# Patient Record
Sex: FEMALE | Race: WHITE | NOT HISPANIC OR LATINO | Employment: OTHER | URBAN - METROPOLITAN AREA
[De-identification: names, ages, dates, MRNs, and addresses within clinical notes are randomized per-mention and may not be internally consistent; named-entity substitution may affect disease eponyms.]

---

## 2024-10-07 ENCOUNTER — APPOINTMENT (OUTPATIENT)
Dept: PRIMARY CARE | Facility: CLINIC | Age: 39
End: 2024-10-07

## 2024-10-07 ENCOUNTER — LAB (OUTPATIENT)
Dept: LAB | Facility: LAB | Age: 39
End: 2024-10-07
Payer: COMMERCIAL

## 2024-10-07 VITALS
TEMPERATURE: 97.3 F | DIASTOLIC BLOOD PRESSURE: 78 MMHG | HEIGHT: 63 IN | SYSTOLIC BLOOD PRESSURE: 113 MMHG | WEIGHT: 184 LBS | HEART RATE: 64 BPM | BODY MASS INDEX: 32.6 KG/M2 | OXYGEN SATURATION: 97 %

## 2024-10-07 DIAGNOSIS — S29.012S STRAIN OF MUSCLE AND TENDON OF BACK WALL OF THORAX, SEQUELA: ICD-10-CM

## 2024-10-07 DIAGNOSIS — Z00.00 HEALTHCARE MAINTENANCE: ICD-10-CM

## 2024-10-07 DIAGNOSIS — Z00.00 ENCOUNTER FOR ADULT WELLNESS VISIT: Primary | ICD-10-CM

## 2024-10-07 PROCEDURE — 80061 LIPID PANEL: CPT

## 2024-10-07 PROCEDURE — 85025 COMPLETE CBC W/AUTO DIFF WBC: CPT

## 2024-10-07 PROCEDURE — 36415 COLL VENOUS BLD VENIPUNCTURE: CPT

## 2024-10-07 PROCEDURE — 80053 COMPREHEN METABOLIC PANEL: CPT

## 2024-10-07 PROCEDURE — 82306 VITAMIN D 25 HYDROXY: CPT

## 2024-10-07 PROCEDURE — 3008F BODY MASS INDEX DOCD: CPT | Performed by: STUDENT IN AN ORGANIZED HEALTH CARE EDUCATION/TRAINING PROGRAM

## 2024-10-07 PROCEDURE — 84443 ASSAY THYROID STIM HORMONE: CPT

## 2024-10-07 PROCEDURE — 1036F TOBACCO NON-USER: CPT | Performed by: STUDENT IN AN ORGANIZED HEALTH CARE EDUCATION/TRAINING PROGRAM

## 2024-10-07 PROCEDURE — 99395 PREV VISIT EST AGE 18-39: CPT | Performed by: STUDENT IN AN ORGANIZED HEALTH CARE EDUCATION/TRAINING PROGRAM

## 2024-10-07 ASSESSMENT — ENCOUNTER SYMPTOMS
LIGHT-HEADEDNESS: 0
PALPITATIONS: 0
NERVOUS/ANXIOUS: 0
NAUSEA: 0
CONSTIPATION: 0
FREQUENCY: 0
FEVER: 0
COUGH: 0
DIZZINESS: 0
CHILLS: 0
SHORTNESS OF BREATH: 0
ABDOMINAL PAIN: 0
MYALGIAS: 1
FATIGUE: 0
DIARRHEA: 0
DYSPHORIC MOOD: 0
NUMBNESS: 0
RHINORRHEA: 0
COLOR CHANGE: 0
VOMITING: 0
DYSURIA: 0
ARTHRALGIAS: 0

## 2024-10-07 ASSESSMENT — PATIENT HEALTH QUESTIONNAIRE - PHQ9
SUM OF ALL RESPONSES TO PHQ9 QUESTIONS 1 AND 2: 0
1. LITTLE INTEREST OR PLEASURE IN DOING THINGS: NOT AT ALL
2. FEELING DOWN, DEPRESSED OR HOPELESS: NOT AT ALL

## 2024-10-07 NOTE — PATIENT INSTRUCTIONS
N acetyl Cysteine 600mg up to 3 times a day during illness.    Thank you for coming in for your wellness examination.  I would add order lab work for you.  Since you are fasting now, you can go right downstairs and get this done.    Keep up the good work with overall fairly healthy diet and some of the changes that you are making.  As we discussed, I recommend plenty of fruits and vegetables, lean sources of protein, watching your carbohydrate intake and opting for more complex sources and avoiding added sugars, as well as not drinking extra calories.  For exercise I recommend some sort of combination of cardiovascular activity, strength training as well as proper warming up and stretching.  You do need a fairly active lifestyle for the most part especially with your job and being on your feet for most of the day as well as with 2 young kids.  Whenever you can work and fracture timing I am always going to recommend in terms of additional exercise.    For your back, it does seem more like a muscle strain on the left side.  I would recommend using regular heating pads as well as stretching.  We could consider physical therapy down the line if it fails to make adequate improvement.  I would also recommend slowly increasing strength training on that side to increase muscle tone to prevent further injuries in the future.    Excellent work with avoiding tobacco or other drug use and limiting alcohol content.    Please continue with regular follow-up with your OB/GYN.    Please continue with yearly follow-up.    Please call sooner with any other acute concerns or complaints.    Thank you

## 2024-10-07 NOTE — PROGRESS NOTES
"Subjective   Patient ID: Sosa Amos is a 39 y.o. female who presents for [facility] ( ).    HPI     No acute concerns or complaints today.  She has had some left mid back pain that has overall improved.    Will be getting set up with a dermatologist.    Dental: Regular dental exams twice yearly.  Vision: No recent eye exams. Sometimes wears glasses at night.    Diet: Overall balanced. 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 Less carbs and more fruits and vegetables. Lots of water.  Exercise: On her feet all day. Sometimes walking.  Caffeine: Not much  Fluids: Well hydrated.  Supplements: Prenatal, vitamin D, zinc    Tobacco: None. Never.  Alcohol: Maybe 1 drink per week.  Recreational Drugs: None.    Last Colonoscopy: No family history colon cancer.  Last Pap smear: Following with her Ob/Gyn. Up to date on PAPs.  Mammogram: Maternal grandmother diagnosed in her 70s  Low Dose Lung CT Screening: Not indicated.  AAA Screening: Not indicated.    Influenza: recommended annually.  Tetanus: 2023.  COVID: Recommended updated vaccine.    Review of Systems   Constitutional:  Negative for chills, fatigue and fever.   HENT:  Negative for congestion and rhinorrhea.    Eyes:  Negative for visual disturbance.   Respiratory:  Negative for cough and shortness of breath.    Cardiovascular:  Negative for chest pain and palpitations.   Gastrointestinal:  Negative for abdominal pain, constipation, diarrhea, nausea and vomiting.   Genitourinary:  Negative for dysuria and frequency.   Musculoskeletal:  Positive for myalgias (left flank/back strain). Negative for arthralgias.   Skin:  Negative for color change and rash.   Neurological:  Negative for dizziness, light-headedness and numbness.   Psychiatric/Behavioral:  Negative for dysphoric mood. The patient is not nervous/anxious.        Objective   /78   Pulse 64   Temp 36.3 °C (97.3 °F)   Ht 1.588 m (5' 2.5\")   Wt 83.5 kg (184 lb)   SpO2 97%   BMI 33.12 kg/m² "   BSA Body surface area is 1.92 meters squared.      Physical Exam  Vitals and nursing note reviewed.   Constitutional:       General: She is not in acute distress.     Appearance: Normal appearance. She is normal weight. She is not ill-appearing or toxic-appearing.   HENT:      Head: Normocephalic and atraumatic.      Right Ear: Tympanic membrane, ear canal and external ear normal.      Left Ear: Tympanic membrane, ear canal and external ear normal.      Nose: Nose normal.      Mouth/Throat:      Mouth: Mucous membranes are moist.   Eyes:      Extraocular Movements: Extraocular movements intact.      Conjunctiva/sclera: Conjunctivae normal.      Pupils: Pupils are equal, round, and reactive to light.   Cardiovascular:      Rate and Rhythm: Normal rate and regular rhythm.      Pulses: Normal pulses.      Heart sounds: Normal heart sounds.   Pulmonary:      Effort: Pulmonary effort is normal.      Breath sounds: Normal breath sounds.   Abdominal:      General: Abdomen is flat. Bowel sounds are normal.      Palpations: Abdomen is soft.   Musculoskeletal:         General: Normal range of motion.      Cervical back: Normal range of motion and neck supple.   Skin:     General: Skin is warm.   Neurological:      General: No focal deficit present.      Mental Status: She is alert and oriented to person, place, and time. Mental status is at baseline.      Cranial Nerves: No cranial nerve deficit.      Sensory: No sensory deficit.   Psychiatric:         Mood and Affect: Mood normal.         Behavior: Behavior normal.         Thought Content: Thought content normal.         Judgment: Judgment normal.       No visits with results within 1 Year(s) from this visit.   Latest known visit with results is:   Legacy Encounter on 01/09/2023   Component Date Value Ref Range Status    TSH 01/09/2023 1.57  0.44 - 3.98 mIU/L Final    Comment:  TSH testing is performed using different testing    methodology at [facility] than  at other    [facility] hospitals. Direct result comparisons should    only be made within the same method.      Cholesterol 01/09/2023 195  0 - 199 mg/dL Final    Comment: .      AGE      DESIRABLE   BORDERLINE HIGH   HIGH     0-19 Y     0 - 169       170 - 199     >/= 200    20-24 Y     0 - 189       190 - 224     >/= 225         >24 Y     0 - 199       200 - 239     >/= 240   **All ranges are based on fasting samples. Specific   therapeutic targets will vary based on patient-specific   cardiac risk.  .   Pediatric guidelines reference:Pediatrics 2011, 128(S5).   Adult guidelines reference: NCEP ATPIII Guidelines,     KAREEN 2001, 258:4066-97  .   Venipuncture immediately after or during the    administration of Metamizole may lead to falsely   low results. Testing should be performed immediately   prior to Metamizole dosing.      HDL 01/09/2023 63.6  mg/dL Final    Comment: .      AGE      VERY LOW   LOW     NORMAL    HIGH       0-19 Y       < 35   < 40     40-45     ----    20-24 Y       ----   < 40       >45     ----      >24 Y       ----   < 40     40-60      >60  .      Cholesterol/HDL Ratio 01/09/2023 3.1   Final    Comment: REF VALUES  DESIRABLE  < 3.4  HIGH RISK  > 5.0      LDL 01/09/2023 115 (H)  0 - 99 mg/dL Final    Comment: .                           NEAR      BORD      AGE      DESIRABLE  OPTIMAL    HIGH     HIGH     VERY HIGH     0-19 Y     0 - 109     ---    110-129   >/= 130     ----    20-24 Y     0 - 119     ---    120-159   >/= 160     ----      >24 Y     0 -  99   100-129  130-159   160-189     >/=190  .      VLDL 01/09/2023 17  0 - 40 mg/dL Final    Triglycerides 01/09/2023 84  0 - 149 mg/dL Final    Comment: .      AGE      DESIRABLE   BORDERLINE HIGH   HIGH     VERY HIGH   0 D-90 D    19 - 174         ----         ----        ----  91 D- 9 Y     0 -  74        75 -  99     >/= 100      ----    10-19 Y     0 -  89        90 - 129     >/= 130      ----    20-24 Y     0 - 114       115 - 149     >/=  150      ----         >24 Y     0 - 149       150 - 199    200- 499    >/= 500  .   Venipuncture immediately after or during the    administration of Metamizole may lead to falsely   low results. Testing should be performed immediately   prior to Metamizole dosing.      WBC 01/09/2023 7.1  4.4 - 11.3 x10E9/L Final    nRBC 01/09/2023 0.0  0.0 - 0.0 /100 WBC Final    RBC 01/09/2023 4.20  4.00 - 5.20 x10E12/L Final    Hemoglobin 01/09/2023 12.9  12.0 - 16.0 g/dL Final    Hematocrit 01/09/2023 39.6  36.0 - 46.0 % Final    MCV 01/09/2023 94  80 - 100 fL Final    MCHC 01/09/2023 32.6  32.0 - 36.0 g/dL Final    Platelets 01/09/2023 274  150 - 450 x10E9/L Final    RDW 01/09/2023 12.9  11.5 - 14.5 % Final    Neutrophils % 01/09/2023 54.5  40.0 - 80.0 % Final    Immature Granulocytes %, Automated 01/09/2023 0.3  0.0 - 0.9 % Final    Comment:  Immature Granulocyte Count (IG) includes promyelocytes,    myelocytes and metamyelocytes but does not include bands.   Percent differential counts (%) should be interpreted in the   context of the absolute cell counts (cells/L).      Lymphocytes % 01/09/2023 33.1  13.0 - 44.0 % Final    Monocytes % 01/09/2023 9.3  2.0 - 10.0 % Final    Eosinophils % 01/09/2023 2.5  0.0 - 6.0 % Final    Basophils % 01/09/2023 0.3  0.0 - 2.0 % Final    Neutrophils Absolute 01/09/2023 3.85  1.20 - 7.70 x10E9/L Final    Lymphocytes Absolute 01/09/2023 2.34  1.20 - 4.80 x10E9/L Final    Monocytes Absolute 01/09/2023 0.66  0.10 - 1.00 x10E9/L Final    Eosinophils Absolute 01/09/2023 0.18  0.00 - 0.70 x10E9/L Final    Basophils Absolute 01/09/2023 0.02  0.00 - 0.10 x10E9/L Final    Glucose 01/09/2023 90  74 - 99 mg/dL Final    Sodium 01/09/2023 140  136 - 145 mmol/L Final    Potassium 01/09/2023 4.4  3.5 - 5.3 mmol/L Final    Chloride 01/09/2023 104  98 - 107 mmol/L Final    Bicarbonate 01/09/2023 28  21 - 32 mmol/L Final    Anion Gap 01/09/2023 12  10 - 20 mmol/L Final    Urea Nitrogen 01/09/2023 12  6 - 23  mg/dL Final    Creatinine 01/09/2023 0.70  0.50 - 1.05 mg/dL Final    GFR Female 01/09/2023 >90  >90 mL/min/1.73m2 Final    Comment:  CALCULATIONS OF ESTIMATED GFR ARE PERFORMED   USING THE 2021 CKD-EPI STUDY REFIT EQUATION   WITHOUT THE RACE VARIABLE FOR THE IDMS-TRACEABLE   CREATININE METHODS.    https://jasn.asnjournals.org/content/early/2021/09/22/ASN.0150782656      Calcium 01/09/2023 9.1  8.6 - 10.6 mg/dL Final    Albumin 01/09/2023 4.3  3.4 - 5.0 g/dL Final    Alkaline Phosphatase 01/09/2023 54  33 - 110 U/L Final    Total Protein 01/09/2023 6.6  6.4 - 8.2 g/dL Final    AST 01/09/2023 15  9 - 39 U/L Final    Total Bilirubin 01/09/2023 0.4  0.0 - 1.2 mg/dL Final    ALT (SGPT) 01/09/2023 10  7 - 45 U/L Final    Comment:  Patients treated with Sulfasalazine may generate    falsely decreased results for ALT.       No current outpatient medications on file prior to visit.     No current facility-administered medications on file prior to visit.     No images are attached to the encounter.        Assessment/Plan   Problem List Items Addressed This Visit    None  Visit Diagnoses         Codes    Encounter for adult wellness visit    -  Primary Z00.00    Healthcare maintenance     Z00.00    Relevant Orders    CBC and Auto Differential    Comprehensive Metabolic Panel    Lipid Panel    TSH with reflex to Free T4 if abnormal    Vitamin D 25-Hydroxy,Total (for eval of Vitamin D levels)    Strain of muscle and tendon of back wall of thorax, sequela     S29.012S          History and physical examination as above.  Patient coming in for wellness care.  Lab work orders placed for the patient to be done fasting.  We will contact her back with results.  Discussed multiple aspects of health including healthy diet and exercise, regular dental and vision care, caffeine use, hydration, supplementation, substance use, screening test and immunizations.  Recommendations given as above.  Patient likely with a strain of her left  thoracic wall as discussed.  She would like to continue with at home management.  Did discuss that if symptoms worsen or fail to continue to improve, we can consider referral to physical therapy.  She will call if she does want this referral.  We will plan on continued regular wellness care based on the results of the lab work.  She will continue with following up with her OB/GYN.  She will come in sooner with any other acute concerns or complaints.

## 2024-10-08 LAB
25(OH)D3 SERPL-MCNC: 45 NG/ML (ref 30–100)
ALBUMIN SERPL BCP-MCNC: 4.4 G/DL (ref 3.4–5)
ALP SERPL-CCNC: 72 U/L (ref 33–110)
ALT SERPL W P-5'-P-CCNC: 10 U/L (ref 7–45)
ANION GAP SERPL CALC-SCNC: 16 MMOL/L (ref 10–20)
AST SERPL W P-5'-P-CCNC: 13 U/L (ref 9–39)
BASOPHILS # BLD AUTO: 0.03 X10*3/UL (ref 0–0.1)
BASOPHILS NFR BLD AUTO: 0.3 %
BILIRUB SERPL-MCNC: 0.5 MG/DL (ref 0–1.2)
BUN SERPL-MCNC: 16 MG/DL (ref 6–23)
CALCIUM SERPL-MCNC: 9.4 MG/DL (ref 8.6–10.6)
CHLORIDE SERPL-SCNC: 104 MMOL/L (ref 98–107)
CHOLEST SERPL-MCNC: 189 MG/DL (ref 0–199)
CHOLESTEROL/HDL RATIO: 3
CO2 SERPL-SCNC: 24 MMOL/L (ref 21–32)
CREAT SERPL-MCNC: 0.71 MG/DL (ref 0.5–1.05)
EGFRCR SERPLBLD CKD-EPI 2021: >90 ML/MIN/1.73M*2
EOSINOPHIL # BLD AUTO: 0.15 X10*3/UL (ref 0–0.7)
EOSINOPHIL NFR BLD AUTO: 1.7 %
ERYTHROCYTE [DISTWIDTH] IN BLOOD BY AUTOMATED COUNT: 12.4 % (ref 11.5–14.5)
GLUCOSE SERPL-MCNC: 95 MG/DL (ref 74–99)
HCT VFR BLD AUTO: 40.4 % (ref 36–46)
HDLC SERPL-MCNC: 63.8 MG/DL
HGB BLD-MCNC: 13.6 G/DL (ref 12–16)
IMM GRANULOCYTES # BLD AUTO: 0.03 X10*3/UL (ref 0–0.7)
IMM GRANULOCYTES NFR BLD AUTO: 0.3 % (ref 0–0.9)
LDLC SERPL CALC-MCNC: 112 MG/DL
LYMPHOCYTES # BLD AUTO: 2.11 X10*3/UL (ref 1.2–4.8)
LYMPHOCYTES NFR BLD AUTO: 23.8 %
MCH RBC QN AUTO: 31.2 PG (ref 26–34)
MCHC RBC AUTO-ENTMCNC: 33.7 G/DL (ref 32–36)
MCV RBC AUTO: 93 FL (ref 80–100)
MONOCYTES # BLD AUTO: 0.79 X10*3/UL (ref 0.1–1)
MONOCYTES NFR BLD AUTO: 8.9 %
NEUTROPHILS # BLD AUTO: 5.74 X10*3/UL (ref 1.2–7.7)
NEUTROPHILS NFR BLD AUTO: 65 %
NON HDL CHOLESTEROL: 125 MG/DL (ref 0–149)
NRBC BLD-RTO: 0 /100 WBCS (ref 0–0)
PLATELET # BLD AUTO: 235 X10*3/UL (ref 150–450)
POTASSIUM SERPL-SCNC: 4.1 MMOL/L (ref 3.5–5.3)
PROT SERPL-MCNC: 7.4 G/DL (ref 6.4–8.2)
RBC # BLD AUTO: 4.36 X10*6/UL (ref 4–5.2)
SODIUM SERPL-SCNC: 140 MMOL/L (ref 136–145)
TRIGL SERPL-MCNC: 68 MG/DL (ref 0–149)
TSH SERPL-ACNC: 1.39 MIU/L (ref 0.44–3.98)
VLDL: 14 MG/DL (ref 0–40)
WBC # BLD AUTO: 8.9 X10*3/UL (ref 4.4–11.3)